# Patient Record
Sex: FEMALE | Race: WHITE | NOT HISPANIC OR LATINO | URBAN - METROPOLITAN AREA
[De-identification: names, ages, dates, MRNs, and addresses within clinical notes are randomized per-mention and may not be internally consistent; named-entity substitution may affect disease eponyms.]

---

## 2017-03-01 ENCOUNTER — OFFICE VISIT - RIVER FALLS (OUTPATIENT)
Dept: FAMILY MEDICINE | Facility: CLINIC | Age: 52
End: 2017-03-01

## 2022-02-15 NOTE — PROGRESS NOTES
Patient:   АННА SIERRA            MRN: [id number]            FIN: [id number]               Age:   51 years     Sex:  Female     :  1965   Associated Diagnoses:   Depression, major, recurrent   Author:   Sayda Riggins      Chief Complaint   3/1/2017 8:46 AM CST     Pt presents for a medication check for depression. States she is good today.        History of Present Illness   Symptoms and concerns as expressed in CC reviewed and confirmed with patient. Here in f/u, requests refill sertraline. Did not f/u with DR Aguiar as had no insurance an dno support systems for meds for alcoholism. She states she is doing better, still drinks daily, sometimes more, last night just one drink. Her partner drinks alot and so that makes it hard for her not todrink. She has a new job she hates but it is good money, has insurance for now. Has never been through ETOH treatment  Does feel like she does not have control of ETOHO use. States never drinks in the morning, only after work.  States kids are good at home      Health Status   Allergies:    Allergic Reactions (Selected)  No Known Medication Allergies   Medications:  (Selected)   Prescriptions  Prescribed  sertraline 100 mg oral tablet: 1 tab(s) ( 100 mg ), PO, Daily, # 90 tab(s), 1 Refill(s), Type: Maintenance, Pharmacy: [facility], 1 tab(s) po daily   Problem list:    All Problems  Attempted suicide / SNOMED CT 947674205 / Confirmed  [facility] ER with sertraline and ibuprofen  Pap smear of cervix shows high risk HPV present / SNOMED CT 9524579294 / Confirmed  repeat pap in 1 year  Obesity / SNOMED CT 9165879176 / Probable  Depression, major, recurrent / SNOMED CT 001678838 / Confirmed  Positive depression screening / IMO [id number] / Confirmed  Resolved: *Hospitalized@[facility] - Intentional drug overdose, depression, suicide attempt  Resolved: tubal ligation and ablation      Histories   Past Medical History:    Resolved  *Hospitalized@[facility] -  Intentional drug overdose, depression, suicide attempt: Onset on 3/23/2013 at 47 years.  Resolved on 3/24/2013 at 47 years.  tubal ligation and ablation: Onset in 2004 at 39 years.  Resolved.   Family History:    Lupus  Sister  Heart disease  Father  Arthritis  Mother     Procedure history:    No active procedure history items have been selected or recorded.   Social History:        Alcohol Assessment            Past      Tobacco Assessment            Never smoker      Substance Abuse Assessment            Never      Employment and Education Assessment            Employed, Work/School description: .      Nutrition and Health Assessment            Type of diet: Regular.      Exercise and Physical Activity Assessment: Does not exercise      Sexual Assessment            Sexually active: Yes.  Sexual orientation: Heterosexual.  Contraceptive Use Details: tubal.        Physical Examination   Vital Signs   3/1/2017 8:46 AM CST Temperature Tympanic 97.6 DegF  LOW    Peripheral Pulse Rate 80 bpm    Systolic Blood Pressure 120 mmHg    Diastolic Blood Pressure 76 mmHg    Mean Arterial Pressure 91 mmHg    BP Site Right arm    BP Method Manual      Measurements from flowsheet : Measurements   3/1/2017 8:46 AM CST Height Measured - Standard 62 in    Weight Measured - Standard 172.8 lb    BSA 1.85 m2    Body Mass Index 31.6 kg/m2      General:  Alert and oriented, No acute distress.       Impression and Plan   Diagnosis     Depression, major, recurrent ([id number] F33.1).     Patient Instructions:       Counseled: Patient, Regarding diagnosis, Regarding treatment, Regarding medications, Verbalized understanding, I am concerned about her ETOH use and asked her to have eval for out patient care at [facility] for [facility] in [place]. She had been going to  but not any more. SHe states she will f/u with [facility]  Refill sertraline, no thoughts of self harm  WIll re eval what it is she wants out of her work, 15 min in  counseling  .    Orders     Orders (Selected)   Outpatient Orders  Completed  Referral (Request): 03/01/17 9:10:00 CST, Referred to: Other, Reason for referral: [facility]. Jatin YOUSSEF, ETOH abuse  Prescriptions  Prescribed  sertraline 100 mg oral tablet: 1 tab(s) ( 100 mg ), PO, Daily, # 90 tab(s), 1 Refill(s), Type: Maintenance, Pharmacy: [facility], 1 tab(s) po daily.

## 2022-02-15 NOTE — NURSING NOTE
Medication Refill    PCP:   _NCB    Name of med requested:  _Sertraline    Date of last office visit and reason:  _3-1-17, depression/ETOH      Date of last Med Check / Px:   _3-1-17 med check, 3-23-16 px    Date of last labs pertaining to med:  _    RTC order in chart:  _Past due for labs and pap    For Protocol refill, has patient been contacted:  _pt lvm requesting refill of med because she has not been able to get in yet; she is hoping to get in next week; to fill at [facility] in [place]; 30 day supply will be filled per protocol; pt will be notified at [phone]